# Patient Record
Sex: FEMALE | Race: WHITE | URBAN - METROPOLITAN AREA
[De-identification: names, ages, dates, MRNs, and addresses within clinical notes are randomized per-mention and may not be internally consistent; named-entity substitution may affect disease eponyms.]

---

## 2020-11-13 ENCOUNTER — HISTORICAL (OUTPATIENT)
Dept: ADMINISTRATIVE | Facility: HOSPITAL | Age: 80
End: 2020-11-13

## 2020-11-13 LAB
ABS NEUT (OLG): 3.46 X10(3)/MCL (ref 2.1–9.2)
BASOPHILS # BLD AUTO: 0 X10(3)/MCL (ref 0–0.2)
BASOPHILS NFR BLD AUTO: 0 %
BUN SERPL-MCNC: 21.6 MG/DL (ref 9.8–20.1)
CALCIUM SERPL-MCNC: 8 MG/DL (ref 8.4–10.2)
CHLORIDE SERPL-SCNC: 110 MMOL/L (ref 98–107)
CO2 SERPL-SCNC: 22 MMOL/L (ref 23–31)
CREAT SERPL-MCNC: 0.95 MG/DL (ref 0.55–1.02)
CREAT/UREA NIT SERPL: 23
EOSINOPHIL # BLD AUTO: 0.1 X10(3)/MCL (ref 0–0.9)
EOSINOPHIL NFR BLD AUTO: 2 %
ERYTHROCYTE [DISTWIDTH] IN BLOOD BY AUTOMATED COUNT: 13.3 % (ref 11.5–17)
GLUCOSE SERPL-MCNC: 106 MG/DL (ref 82–115)
HCT VFR BLD AUTO: 30.4 % (ref 37–47)
HGB BLD-MCNC: 9.9 GM/DL (ref 12–16)
LYMPHOCYTES # BLD AUTO: 1.8 X10(3)/MCL (ref 0.6–4.6)
LYMPHOCYTES NFR BLD AUTO: 29 %
MCH RBC QN AUTO: 29.7 PG (ref 27–31)
MCHC RBC AUTO-ENTMCNC: 32.6 GM/DL (ref 33–36)
MCV RBC AUTO: 91.3 FL (ref 80–94)
MONOCYTES # BLD AUTO: 0.6 X10(3)/MCL (ref 0.1–1.3)
MONOCYTES NFR BLD AUTO: 10 %
NEUTROPHILS # BLD AUTO: 3.46 X10(3)/MCL (ref 2.1–9.2)
NEUTROPHILS NFR BLD AUTO: 58 %
PLATELET # BLD AUTO: 244 X10(3)/MCL (ref 130–400)
PMV BLD AUTO: 11 FL (ref 9.4–12.4)
POTASSIUM SERPL-SCNC: 4.1 MMOL/L (ref 3.5–5.1)
RBC # BLD AUTO: 3.33 X10(6)/MCL (ref 4.2–5.4)
SODIUM SERPL-SCNC: 140 MMOL/L (ref 136–145)
WBC # SPEC AUTO: 6 X10(3)/MCL (ref 4.5–11.5)

## 2020-12-15 ENCOUNTER — HISTORICAL (OUTPATIENT)
Dept: ADMINISTRATIVE | Facility: HOSPITAL | Age: 80
End: 2020-12-15

## 2020-12-15 LAB
ABS NEUT (OLG): 6.1 X10(3)/MCL (ref 2.1–9.2)
BASOPHILS # BLD AUTO: 0 X10(3)/MCL (ref 0–0.2)
BASOPHILS NFR BLD AUTO: 0 %
EOSINOPHIL # BLD AUTO: 0.2 X10(3)/MCL (ref 0–0.9)
EOSINOPHIL NFR BLD AUTO: 2 %
ERYTHROCYTE [DISTWIDTH] IN BLOOD BY AUTOMATED COUNT: 14.5 % (ref 11.5–17)
HCT VFR BLD AUTO: 29.8 % (ref 37–47)
HGB BLD-MCNC: 9.2 GM/DL (ref 12–16)
LYMPHOCYTES # BLD AUTO: 1.9 X10(3)/MCL (ref 0.6–4.6)
LYMPHOCYTES NFR BLD AUTO: 21 %
MCH RBC QN AUTO: 28.8 PG (ref 27–31)
MCHC RBC AUTO-ENTMCNC: 30.9 GM/DL (ref 33–36)
MCV RBC AUTO: 93.1 FL (ref 80–94)
MONOCYTES # BLD AUTO: 0.7 X10(3)/MCL (ref 0.1–1.3)
MONOCYTES NFR BLD AUTO: 8 %
NEUTROPHILS # BLD AUTO: 6.1 X10(3)/MCL (ref 2.1–9.2)
NEUTROPHILS NFR BLD AUTO: 67 %
PLATELET # BLD AUTO: 385 X10(3)/MCL (ref 130–400)
PMV BLD AUTO: 10.5 FL (ref 9.4–12.4)
RBC # BLD AUTO: 3.2 X10(6)/MCL (ref 4.2–5.4)
WBC # SPEC AUTO: 9 X10(3)/MCL (ref 4.5–11.5)

## 2020-12-22 ENCOUNTER — HISTORICAL (OUTPATIENT)
Dept: ADMINISTRATIVE | Facility: HOSPITAL | Age: 80
End: 2020-12-22

## 2020-12-22 LAB
ABS NEUT (OLG): 12.16 X10(3)/MCL (ref 2.1–9.2)
BASOPHILS # BLD AUTO: 0 X10(3)/MCL (ref 0–0.2)
BASOPHILS NFR BLD AUTO: 0 %
BUN SERPL-MCNC: 31.4 MG/DL (ref 9.8–20.1)
CALCIUM SERPL-MCNC: 7.9 MG/DL (ref 8.4–10.2)
CHLORIDE SERPL-SCNC: 105 MMOL/L (ref 98–107)
CO2 SERPL-SCNC: 23 MMOL/L (ref 23–31)
CREAT SERPL-MCNC: 0.93 MG/DL (ref 0.55–1.02)
CREAT/UREA NIT SERPL: 34
EOSINOPHIL # BLD AUTO: 0.1 X10(3)/MCL (ref 0–0.9)
EOSINOPHIL NFR BLD AUTO: 1 %
ERYTHROCYTE [DISTWIDTH] IN BLOOD BY AUTOMATED COUNT: 15.3 % (ref 11.5–17)
GLUCOSE SERPL-MCNC: 111 MG/DL (ref 82–115)
HCT VFR BLD AUTO: 30.9 % (ref 37–47)
HGB BLD-MCNC: 9.3 GM/DL (ref 12–16)
LYMPHOCYTES # BLD AUTO: 1.4 X10(3)/MCL (ref 0.6–4.6)
LYMPHOCYTES NFR BLD AUTO: 10 %
MCH RBC QN AUTO: 27.5 PG (ref 27–31)
MCHC RBC AUTO-ENTMCNC: 30.1 GM/DL (ref 33–36)
MCV RBC AUTO: 91.4 FL (ref 80–94)
MONOCYTES # BLD AUTO: 1.1 X10(3)/MCL (ref 0.1–1.3)
MONOCYTES NFR BLD AUTO: 7 %
NEUTROPHILS # BLD AUTO: 12.16 X10(3)/MCL (ref 2.1–9.2)
NEUTROPHILS NFR BLD AUTO: 81 %
PLATELET # BLD AUTO: 528 X10(3)/MCL (ref 130–400)
PMV BLD AUTO: 10.2 FL (ref 9.4–12.4)
POTASSIUM SERPL-SCNC: 3.8 MMOL/L (ref 3.5–5.1)
RBC # BLD AUTO: 3.38 X10(6)/MCL (ref 4.2–5.4)
SODIUM SERPL-SCNC: 139 MMOL/L (ref 136–145)
WBC # SPEC AUTO: 15 X10(3)/MCL (ref 4.5–11.5)

## 2021-01-05 ENCOUNTER — HISTORICAL (OUTPATIENT)
Dept: ADMINISTRATIVE | Facility: HOSPITAL | Age: 81
End: 2021-01-05

## 2021-01-05 LAB
ABS NEUT (OLG): 5.51 X10(3)/MCL (ref 2.1–9.2)
ALBUMIN SERPL-MCNC: 1.6 GM/DL (ref 3.4–4.8)
ALBUMIN/GLOB SERPL: 0.5 RATIO (ref 1.1–2)
ALP SERPL-CCNC: 86 UNIT/L (ref 40–150)
ALT SERPL-CCNC: 8 UNIT/L (ref 0–55)
AST SERPL-CCNC: 25 UNIT/L (ref 5–34)
BASOPHILS # BLD AUTO: 0 X10(3)/MCL (ref 0–0.2)
BASOPHILS NFR BLD AUTO: 0 %
BILIRUB SERPL-MCNC: <0.5 MG/DL
BILIRUBIN DIRECT+TOT PNL SERPL-MCNC: 0.2 MG/DL (ref 0–0.5)
BILIRUBIN DIRECT+TOT PNL SERPL-MCNC: <0.3 MG/DL (ref 0–0.8)
BUN SERPL-MCNC: 21.6 MG/DL (ref 9.8–20.1)
CALCIUM SERPL-MCNC: 7.3 MG/DL (ref 8.4–10.2)
CHLORIDE SERPL-SCNC: 110 MMOL/L (ref 98–107)
CO2 SERPL-SCNC: 18 MMOL/L (ref 23–31)
CREAT SERPL-MCNC: 0.74 MG/DL (ref 0.55–1.02)
EOSINOPHIL # BLD AUTO: 0 X10(3)/MCL (ref 0–0.9)
EOSINOPHIL NFR BLD AUTO: 1 %
ERYTHROCYTE [DISTWIDTH] IN BLOOD BY AUTOMATED COUNT: 16.7 % (ref 11.5–17)
GLOBULIN SER-MCNC: 3.2 GM/DL (ref 2.4–3.5)
GLUCOSE SERPL-MCNC: 101 MG/DL (ref 82–115)
HCT VFR BLD AUTO: 26.1 % (ref 37–47)
HGB BLD-MCNC: 7.8 GM/DL (ref 12–16)
LYMPHOCYTES # BLD AUTO: 1 X10(3)/MCL (ref 0.6–4.6)
LYMPHOCYTES NFR BLD AUTO: 13 %
MCH RBC QN AUTO: 26.4 PG (ref 27–31)
MCHC RBC AUTO-ENTMCNC: 29.9 GM/DL (ref 33–36)
MCV RBC AUTO: 88.5 FL (ref 80–94)
MONOCYTES # BLD AUTO: 0.6 X10(3)/MCL (ref 0.1–1.3)
MONOCYTES NFR BLD AUTO: 8 %
NEUTROPHILS # BLD AUTO: 5.51 X10(3)/MCL (ref 2.1–9.2)
NEUTROPHILS NFR BLD AUTO: 77 %
PLATELET # BLD AUTO: 356 X10(3)/MCL (ref 130–400)
PMV BLD AUTO: 10.3 FL (ref 9.4–12.4)
POTASSIUM SERPL-SCNC: 4.8 MMOL/L (ref 3.5–5.1)
PROT SERPL-MCNC: 4.8 GM/DL (ref 5.8–7.6)
RBC # BLD AUTO: 2.95 X10(6)/MCL (ref 4.2–5.4)
SODIUM SERPL-SCNC: 140 MMOL/L (ref 136–145)
WBC # SPEC AUTO: 7.2 X10(3)/MCL (ref 4.5–11.5)

## 2021-02-19 ENCOUNTER — HISTORICAL (OUTPATIENT)
Dept: ADMINISTRATIVE | Facility: HOSPITAL | Age: 81
End: 2021-02-19

## 2021-02-19 LAB
APPEARANCE, UA: ABNORMAL
BACTERIA SPEC CULT: ABNORMAL /HPF
BILIRUB UR QL STRIP: NEGATIVE
COLOR UR: YELLOW
GLUCOSE (UA): NEGATIVE
HGB UR QL STRIP: ABNORMAL
KETONES UR QL STRIP: NEGATIVE
LEUKOCYTE ESTERASE UR QL STRIP: ABNORMAL
NITRITE UR QL STRIP: NEGATIVE
PH UR STRIP: 6.5 [PH] (ref 5–9)
PROT UR QL STRIP: ABNORMAL
RBC #/AREA URNS HPF: ABNORMAL /HPF
SP GR UR STRIP: 1.02 (ref 1–1.03)
SQUAMOUS EPITHELIAL, UA: ABNORMAL
UA WBC MAN: >200 /HPF
UROBILINOGEN UR STRIP-ACNC: 0.2

## 2021-02-21 LAB — FINAL CULTURE: NO GROWTH

## 2021-02-23 ENCOUNTER — HISTORICAL (OUTPATIENT)
Dept: ADMINISTRATIVE | Facility: HOSPITAL | Age: 81
End: 2021-02-23

## 2021-02-23 LAB
ERYTHROCYTE [DISTWIDTH] IN BLOOD BY AUTOMATED COUNT: 18.9 % (ref 11.5–17)
HCT VFR BLD AUTO: 28 % (ref 37–47)
HGB BLD-MCNC: 8.4 GM/DL (ref 12–16)
MCH RBC QN AUTO: 27.5 PG (ref 27–31)
MCHC RBC AUTO-ENTMCNC: 30 GM/DL (ref 33–36)
MCV RBC AUTO: 91.8 FL (ref 80–94)
PLATELET # BLD AUTO: 211 X10(3)/MCL (ref 130–400)
PMV BLD AUTO: 11.6 FL (ref 9.4–12.4)
RBC # BLD AUTO: 3.05 X10(6)/MCL (ref 4.2–5.4)
WBC # SPEC AUTO: 5.3 X10(3)/MCL (ref 4.5–11.5)

## 2021-05-25 ENCOUNTER — HOSPITAL ENCOUNTER (OUTPATIENT)
Dept: MEDSURG UNIT | Facility: HOSPITAL | Age: 81
End: 2021-05-28
Attending: INTERNAL MEDICINE | Admitting: INTERNAL MEDICINE

## 2021-05-25 LAB
CK MB SERPL-MCNC: 0.7 NG/ML
CK MB SERPL-MCNC: 1.1 NG/ML
CK SERPL-CCNC: 32 U/L (ref 29–168)
CK SERPL-CCNC: 43 U/L (ref 29–168)
SARS-COV-2 AG RESP QL IA.RAPID: NOT DETECTED
TROPONIN I SERPL-MCNC: 0.08 NG/ML (ref 0–0.04)
TROPONIN I SERPL-MCNC: 0.09 NG/ML (ref 0–0.04)

## 2021-05-26 LAB
ABS NEUT (OLG): 4.3 X10(3)/MCL (ref 1.5–6.9)
ALBUMIN SERPL-MCNC: 2.7 GM/DL (ref 3.4–4.8)
ALBUMIN/GLOB SERPL: 1 RATIO (ref 1.1–2)
ALP SERPL-CCNC: 91 UNIT/L (ref 40–150)
ALT SERPL-CCNC: 11 UNIT/L (ref 0–55)
AST SERPL-CCNC: 14 UNIT/L (ref 5–34)
BILIRUB SERPL-MCNC: 0.2 MG/DL
BILIRUBIN DIRECT+TOT PNL SERPL-MCNC: 0.1 MG/DL (ref 0–0.5)
BILIRUBIN DIRECT+TOT PNL SERPL-MCNC: 0.1 MG/DL (ref 0–0.8)
BNP BLD-MCNC: 1567.2 PG/ML
BUN SERPL-MCNC: 26 MG/DL (ref 9.8–20.1)
CALCIUM SERPL-MCNC: 8.3 MG/DL (ref 8.4–10.2)
CHLORIDE SERPL-SCNC: 105 MMOL/L (ref 98–107)
CK MB SERPL-MCNC: 0.6 NG/ML
CK SERPL-CCNC: 26 U/L (ref 29–168)
CO2 SERPL-SCNC: 26 MMOL/L (ref 23–31)
CREAT SERPL-MCNC: 1.26 MG/DL (ref 0.55–1.02)
ERYTHROCYTE [DISTWIDTH] IN BLOOD BY AUTOMATED COUNT: 16.4 % (ref 11.5–17)
GLOBULIN SER-MCNC: 2.8 GM/DL (ref 2.4–3.5)
GLUCOSE SERPL-MCNC: 128 MG/DL (ref 82–115)
HCT VFR BLD AUTO: 26.1 % (ref 36–48)
HGB BLD-MCNC: 8.4 GM/DL (ref 12–16)
IMM GRANULOCYTES # BLD AUTO: 0.03 10*3/UL (ref 0–0.02)
IMM GRANULOCYTES NFR BLD AUTO: 0.5 % (ref 0–0.43)
LYMPHOCYTES # BLD AUTO: 0.9 X10(3)/MCL (ref 0.5–4.1)
LYMPHOCYTES NFR BLD AUTO: 16 % (ref 15–40)
MAGNESIUM SERPL-MCNC: 1.9 MG/DL (ref 1.6–2.6)
MCH RBC QN AUTO: 30 PG (ref 27–34)
MCHC RBC AUTO-ENTMCNC: 32 GM/DL (ref 31–36)
MCV RBC AUTO: 93 FL (ref 80–99)
MONOCYTES # BLD AUTO: 0.6 X10(3)/MCL (ref 0–1.1)
MONOCYTES NFR BLD AUTO: 10 % (ref 4–12)
NEUTROPHILS # BLD AUTO: 4.3 X10(3)/MCL (ref 1.5–6.9)
NEUTROPHILS NFR BLD AUTO: 74 % (ref 43–75)
PLATELET # BLD AUTO: 180 X10(3)/MCL (ref 140–400)
PMV BLD AUTO: 10.7 FL (ref 6.8–10)
POTASSIUM SERPL-SCNC: 4 MMOL/L (ref 3.5–5.1)
PROT SERPL-MCNC: 5.5 GM/DL (ref 5.8–7.6)
RBC # BLD AUTO: 2.81 X10(6)/MCL (ref 4.2–5.4)
SODIUM SERPL-SCNC: 139 MMOL/L (ref 136–145)
TROPONIN I SERPL-MCNC: 0.07 NG/ML (ref 0–0.04)
WBC # SPEC AUTO: 5.9 X10(3)/MCL (ref 4.5–11.5)

## 2021-07-31 ENCOUNTER — HISTORICAL (OUTPATIENT)
Dept: ADMINISTRATIVE | Facility: HOSPITAL | Age: 81
End: 2021-07-31

## 2022-05-01 NOTE — CONSULTS
Patient:   Humaira Conley             MRN: [id number]            FIN: [id number]               Age:   80 years     Sex:  Female     :  1940   Associated Diagnoses:   None   Author:   Daryl Oro MD      Basic Information   Source of history:  Self, Medical record.       Chief Complaint   SOB      History of Present Illness   Patient is a 81 yo female unknown to [facility]. He presented in transfer from another hospital due to lack of beds. She presented with acute SOB with a BNP greater than 9000 at [place]/ CXR did show some cardiomegaly with pulmonary vascular congestion. Echocardiogram showed an EF of 25%. She was also diagnosed with a UTI as well. She does reside at a NH in [place]. She is already feeling better but was admitted for further treatment. She has diuresed ~1 liter since admission. She denies any CP or SOB.        Review of Systems   Constitutional:  Negative.    Respiratory:  Negative.    Cardiovascular:  Negative.    Gastrointestinal:  Negative.       Health Status   Allergies:    Allergic Reactions (Selected)  Severity Not Documented  Sulfa drugs- Rash.,    Allergies (1) Active Reaction  sulfa drugs Rash     Current medications:  (Selected)   Inpatient Medications  Ordered  Ambien: 10 mg, form: Tab, Oral, Once a day (at bedtime) PRN for insomnia, first dose 21 12:06:00 CDT, STAT, if patient is less than 70 years old  Lovenox: 40 mg, form: Injection, Subcutaneous, Daily, first dose 21 9:00:00 CDT, for all risk catagories  Maalox Antacid [place] Regular Strength: 30 mL, form: Susp, Oral, q4hr PRN for dyspepsia, first dose 21 12:06:00 CDT  Milk of Magnesia: 30 mL, form: Susp, Oral, Daily PRN for constipation, first dose 21 12:06:00 CDT  Plavix 75 mg oral tablet: 75 mg, form: Tab, Oral, qAM, first dose 21 6:00:00 CDT  Remeron: 30 mg, form: Tab, Oral, qPM, first dose 21 21:00:00 CDT  Senokot S: 1 tab(s), form: Tab, Oral, BID PRN for constipation, first dose  05/25/21 12:06:00 CDT, choose only if unrelieved by Milk of Magnesia or choose first if ordered alone.  Tylenol: 1,000 mg, form: Tab, Oral, q6hr PRN for pain, mild, first dose 05/25/21 12:06:00 CDT, or fever; No more than 4 grams in 24 hours  Xanax: 0.25 mg, form: Tab, Oral, q6hr PRN for anxiety, first dose 05/25/21 12:06:00 CDT  Zofran: 4 mg, form: Injection, IV Push, q8hr PRN for nausea/vomiting, first dose 05/25/21 12:06:00 CDT  acetaminophen-hydrocodone 325 mg-5 mg oral tablet: 1 tab(s), form: Tab, Oral, q6hr PRN for pain, first dose 05/25/21 13:05:00 CDT  ascorbic acid 250 mg oral tablet: 500 mg, form: Tab, Oral, BID, first dose 05/25/21 21:00:00 CDT  aspirin 81 mg oral Delayed Release (EC) tablet: 81 mg, form: Tab-EC, Oral, qAM, first dose 05/26/21 6:00:00 CDT  atorvastatin 10 mg oral tablet: 10 mg, form: Tab, Oral, Daily, first dose 05/25/21 17:00:00 CDT  buPROPion 100 mg oral tablet: 100 mg, form: Tab, Oral, qAM, first dose 05/26/21 6:00:00 CDT  calcitriol: 0.5 mcg, form: Cap, Oral, Daily, first dose 05/26/21 9:00:00 CDT  calcium (as carbonate)-vitamin D 600 mg-400 intl units (10 mcg) oral tablet: 1 tab(s), form: Tab, Oral, BID, first dose 05/25/21 12:39:00 CDT  megestrol 40 mg oral tablet: 40 mg, form: Tab, Oral, BID, first dose 05/25/21 21:00:00 CDT  metoprolol tartrate 25 mg oral tab: 25 mg, form: Tab, Oral, Daily, first dose 05/26/21 9:00:00 CDT  senna 8.6 mg oral tablet: 2 tab(s), 17.2 mg =, form: Tab, Oral, BID, first dose 05/25/21 12:40:00 CDT  zinc sulfate 220 mg oral capsule: 220 mg, form: Cap, Oral, Daily, first dose 05/26/21 9:00:00 CDT  Documented Medications  Documented  Multiple Vitamins oral tablet: 1 tab(s), Oral, qAM  Pepcid AC Maximum Strength 20 mg oral tablet: 20 mg = 1 tab(s), Oral, Daily  Plavix 75 mg oral tablet: 75 mg = 1 tab(s), Oral, qAM  Singulair 10 mg oral TABLET: 10 mg = 1 tab(s), Oral, qAM  Vistaril 25 mg oral capsule: 25 mg = 1 cap(s), Oral, q6hr  acetaminophen-hydrocodone 325  mg-5 mg oral tablet: 1 tab(s), Oral, q6hr  amoxicillin-clavulanate 500 mg-125 mg oral tablet: = 1 tab(s), Oral, BID  ascorbic acid 500 mg oral tablet: 500 mg = 1 tab(s), Oral, BID  aspirin 81 mg oral Delayed Release (EC) tablet: 81 mg = 1 tab(s), Oral, qAM  atorvastatin 10 mg oral tablet: 10 mg = 1 tab(s), Oral, Daily  buPROPion 100 mg oral tablet: 100 mg = 1 tab(s), Oral, qAM  calcitriol 0.5 mcg oral capsule: 0.5 mcg = 1 cap(s), Oral, Daily  calcium (as carbonate)-vitamin D 600 mg-400 intl units (10 mcg) oral tablet: 1 tab(s), Oral, BID  calcium carbonate 1250 mg (500 mg elemental calcium) oral tablet: 1250 mg = 1 tab(s), Oral, BID  cyanocobalamin 1000 mcg/mL injectable solution: 1,000 mcg = 1 mL, IM, qMonth, # 10 mL, 0 Refill(s)  ferrous sulfate 324 mg (65 mg elemental iron) oral delayed release tablet: 324 mg = 1 tab(s), Oral, Daily  megestrol 40 mg oral tablet: 40 mg = 1 tab(s), Oral, BID  metoprolol tartrate 25 mg oral tab: 25 mg = 1 tab(s), Oral, Daily  mirtazapine 30 mg oral tablet: 30 mg = 1 tab(s), Oral, qPM  senna 8.6 mg oral tablet: 17.2 mg = 2 tab(s), Oral, BID  zinc sulfate 220 mg oral tablet: 220 mg = 1 tab(s), Oral, Daily   Problem list:    All Problems  Coronary artery disease / SNOMED CT 8851213799 / Confirmed  HTN - Hypertension / SNOMED CT 6279688735 / Confirmed      Histories   Past Medical History:    Active  HTN - Hypertension ([id number])  Coronary artery disease ([id number])   Family History:    No family history items have been selected or recorded.   Procedure history:    No active procedure history items have been selected or recorded.   Social History        Social & Psychosocial Habits    Tobacco  05/25/2021  Use: Never (less than 100 in l    Patient Wants Consult For Cessation Counseling N/A    Abuse/Neglect  05/25/2021  SHX Any signs of abuse or neglect No  .        Physical Examination   General:  No acute distress.    Eye:  Extraocular movements are intact.    Respiratory:  Lungs are  clear to auscultation.    Cardiovascular:  Normal rate, Regular rhythm, No murmur, No edema.       Vital Signs (last 24 hrs)_____  Last Charted___________  Temp Oral     36.5 DegC  (MAY 26 07:39)  Heart Rate Peripheral   89 bpm  (MAY 26 07:39)  Resp Rate         13 br/min  (MAY 25 12:00)  SBP      107 mmHg  (MAY 26 07:39)  DBP      71 mmHg  (MAY 26 07:39)  SpO2      98 %  (MAY 26 07:56)  Weight      48.3 kg  (MAY 26 04:00)  Height      157 cm  (MAY 25 12:08)  BMI      24.26  (MAY 25 12:08)     Musculoskeletal:  Normal range of motion, Left AKA.    Integumentary:  Warm, Dry.    Neurologic:  Alert.    Psychiatric:  Cooperative.       Review / Management   Results review:     Labs (Last four charted values)  WBC                  5.9 (MAY 26)   Hgb                  L 8.4 (MAY 26)   Hct                  L 26.1 (MAY 26)   Plt                  180 (MAY 26)   Na                   139 (MAY 26)   K                    4.0 (MAY 26)   CO2                  26 (MAY 26)   Cl                   105 (MAY 26)   Cr                   H 1.26 (MAY 26)   BUN                  H 26.0 (MAY 26)   Glucose Random       H 128 (MAY 26) .       Impression and Plan     Acute Systolic HF (EF 25%)    Continue Toprol    Unable to start ACEi/ARB due to BP, consider ARB later    Will consider ischemic evaluation as outpatient    Will get her on some low dose PO lasix as she will likely need this upon discharge    Cont ASA/Statin    Discharge once back to baseline and f/u in [facility]    HTN, stable    Continue BB    UTI    Abx per primary    L AKA    She states it was due to infection    Dementia

## 2022-05-01 NOTE — ED PROVIDER NOTES
Patient:   Humaira Conley             MRN: [id number]            FIN: [id number]               Age:   80 years     Sex:  Female     :  1940   Associated Diagnoses:   Congestive heart failure; Urinary tract infection   Author:   Moshe Freeman MD      Basic Information   Time seen: Date & time 2021 09:40:00.   History source: EMS, Physician from [facility].   Arrival mode: Ambulance.   History limitation: Cognitive impairment.   Additional information: Chief Complaint from Nursing Triage Note : Chief Complaint   2021 9:40 CDT       Chief Complaint           brought per [facility] as a transfer from [facility] for admission to a medicine bed for CHF, respiratory acidosis, and UTI  .   Provider/Visit info:   Time Seen:  Moshe Freeman MD / 2021 09:40  .   History of Present Illness   The patient presents with difficulty breathing and cough.  The onset was During the night.  The course/duration of symptoms is resolved: for couple of hours.  Degree at onset severe.  Degree at present none.  The Exacerbating factors is none.  There are Relieving factorss including oxygen and Lasix.  Risk factors consist of hypertension and Dementia.  Prior episodes: none.  Associated symptoms: none.    Patient who has dementia and is a resident of a nursing home was found to have acute shortness of breath and cough during the night, ambulance was called to the treatments and she was put on 100% nonrebreather because she was hypoxic and she was brought to the emergency room and [facility] where the workup reveals that patient has congestive heart failure, she was also found to have urinary tract infection, they did not have any beds the transfer the patient to us for admission and further management.  By the time she came to us she is awake alert sitting in the bed denies any complaints except for some back pain and feeling hungry and she wants to drink some coffee and eat some food.  Denies any other  complaints..        Review of Systems   Constitutional symptoms:  No fever,    Skin symptoms:  No rash,    Respiratory symptoms:  Shortness of breath, cough, no stridor, no wheezing.    Cardiovascular symptoms:  No peripheral edema,    Gastrointestinal symptoms:  No vomiting, no diarrhea, no constipation, no rectal bleeding.    Genitourinary symptoms:  No hematuria,    Musculoskeletal symptoms:  Back pain.   Neurologic symptoms:  Altered level of consciousness, weakness.    Psychiatric symptoms:  No substance abuse,              Additional review of systems information: Unable to obtain due to: Dementia.      Health Status   Allergies:    Allergic Reactions (Selected)  Severity Not Documented  Sulfa drugs- Rash..   Medications: Per nurse's notes.      Past Medical/ Family/ Social History   Medical history:    Active  HTN - Hypertension (SNOMED CT [id number]), Reviewed as documented in chart.   Surgical history: Unknown.   Family history: Unknown.   Social history:    Social & Psychosocial Habits    Tobacco  05/25/2021  Use: Never (less than 100 in l    Patient Wants Consult For Cessation Counseling N/A    Abuse/Neglect  05/25/2021  SHX Any signs of abuse or neglect No  , Family/social situation: Nursing home resident.   Problem list:    No qualifying data available  , per nurse's notes.      Physical Examination               Vital Signs   Vital Signs   5/25/2021 9:40 CDT       Temperature Oral          36.8 DegC                             Temperature Oral (calculated)             98.24 DegF                             Peripheral Pulse Rate     95 bpm                             Respiratory Rate          20 br/min                             SpO2                      99 %                             Oxygen Therapy            Nasal cannula                             Oxygen Flow Rate          2 L/min                             Systolic Blood Pressure   105 mmHg                             Diastolic Blood Pressure   59 mmHg  LOW  .   Measurements   2021 9:40 CDT       Weight Dosing             59.8 kg                             Weight Measured and Calculated in Lbs     131.84 lb                             Weight Estimated          59.8 kg                             Height/Length Dosing      157 cm                             Height/Length Estimated   157 cm                             Body Mass Index Estimated 24.26 kg/m2  .   Basic Oxygen Information   2021 9:40 CDT       SpO2                      99 %                             Oxygen Therapy            Nasal cannula                             Oxygen Flow Rate          2 L/min  .   General:  Alert, no acute distress.    Skin:  Intact.   Head:  Atraumatic.   Neck:  Supple.   Eye:  Extraocular movements are intact.   Ears, nose, mouth and throat:  Oral mucosa moist.   Cardiovascular:  Regular rate and rhythm, No murmur, Normal peripheral perfusion, No edema.    Respiratory:  Lungs are clear to auscultation, respirations are non-labored, breath sounds are equal, Symmetrical chest wall expansion.    Gastrointestinal:  Soft, Non distended, Normal bowel sounds.    Neurological:  No focal neurological deficit observed.   Psychiatric:  Cooperative.      Medical Decision Making   Documents reviewed:  Emergency department nurses' notes.   Orders  Launch Order Profile (Selected)   Inpatient Orders  Ordered  Admit as Inpatient: 21 10:07:00 CDT, Telemetry with Monitor Yonatan EPPS, Colby PAGAN No, CHF, UTI, Dementia  Capacity Management Bed Request: 21 10:07:52 CDT, Telemetry with Monitor  Perineal Care: 21 10:04:18 CDT, BID  Urinary Catheter Monitorin21 10:04:18 CDT, BID  Urinary Catheter Nurse Driven Protocol: BID, 21 10:04:18 CDT  Completed  Urinary Catheter Insertion: 21 10:04:00 CDT, Indwelling, Yes.   Results review:     No qualifying data available.      Impression and Plan   Diagnosis   Congestive heart failure (VTE17-QH I50.9)    Urinary tract infection (JFO81-SN N39.0)      Calls-Consults   -  5/25/2021 10:01:00 , Colby Tam MD, recommends Okay to admit.    Plan   Disposition: Admit time  5/25/2021 10:08:00, Admit to Inpatient Telemetry Unit.    Orders: Launch Orders   Admit/Transfer/Discharge:  Admit as Inpatient (Order): 5/25/2021 10:07 CDT, Telemetry with Monitor Yonatan EPPS, Colby PAGAN, No, CHF, UTI, Dementia,.

## 2022-05-05 NOTE — HISTORICAL [facility]
This is a historical note converted from [employer]. Formatting and pictures may have been removed.  Please reference Reynaldo for original formatting and attached multimedia. Chief Complaint  brought per [facility] as a transfer from [place] ER for admission to a medicine bed for CHF, respiratory acidosis, and UTI  History of Present Illness  79yo female presents in transfer from another hospital due to lack of beds.? She presented with acute SOB which appears to be an acute exacerbation of her CHF.? She does not know her EF.? An ECHO has been ordered.? Her BNP was greater than 9000 at [place]/? CXR did show some cardiomegaly with pulmonary vascular congestion.? She was also diagnosed with a UTI as well.? Currently no N/V/D/C.? NO fever/chills.? No chest pain but she was SOB especially with exertion.? She does reside at [facility] in [place].? She is already feeling better but will be admitted for further treatment.  Review of Systems  ?  ?????Constitutional: ?No fever, No chills, No sweats,?+ weakness, No fatigue. ?  ?????Eye: ?No double vision, No dry eyes, No photophobia. ?  ?????Ear/Nose/Mouth/Throat: ?No ear pain, No nasal congestion, No sore throat. ?  ?????Respiratory:??+ shortness of breath, No cough, No sputum production, No hemoptysis, No wheezing, No pleuritic pain. ?  ?????Cardiovascular: ?No chest pain, No palpitations,?+ peripheral edema, No syncope. ?  ?????Gastrointestinal: ?No nausea, No vomiting, No diarrhea, No constipation, No heartburn, No abdominal pain. ?  ?????Genitourinary: ?No dysuria, No hematuria, No change in urine stream. ?  ?????Hematology/Lymphatics: ?No anemia, No bruising tendency, No bruise, No hemorrhage, No petechiae. ?  ?????Endocrine: ?No excessive thirst, No polyuria, No cold intolerance. ?  ?????Immunologic: ?No recurrent fevers, No recurrent infections. ?  ?????Musculoskeletal: ?Joint pain, No back pain, No muscle pain, No decreased range of motion. ?  ?????Integumentary: ?No rash,  No pruritus, No petechiae, No skin lesion. ?  ?????Neurologic: ?Alert and oriented X4, No abnormal balance, No confusion, No tingling. ?  ?????Psychiatric: ?No anxiety, No depression. ?  Physical Exam  Vitals & Measurements  T:?35.7? ?C (Oral)? TMIN:?35.7? ?C (Oral)? TMAX:?36.8? ?C (Oral)? HR:?80(Peripheral)? RR:?13? BP:?96/55? SpO2:?96%? WT:?59.8?kg? BMI:?24.26?  General: ?Alert and oriented, No acute distress. ?  ??????? ? Appearance: frail. ?  ??????? ? Behavior: Appropriate. ?  ??????? ? Skin: Normal for ethnicity. ?  ?????Eye: ?Pupils are equal, round and reactive to light, Extraocular movements are intact. ?  ?????HENT: ?Normocephalic, Normal hearing, Oral mucosa is moist, No pharyngeal erythema. ?  ?????Neck: ?Supple, Non-tender, No carotid bruit, No jugular venous distention, No lymphadenopathy, No thyromegaly. ?  ?????Respiratory:??few rales at bases, Breath sounds are equal, No chest wall tenderness. ?  ?????Cardiovascular: ?Normal rate, Regular rhythm, No murmur, No gallop, Good pulses equal in all extremities, Normal peripheral perfusion,?trace edema to right leg. ?  ?????Gastrointestinal: ?Soft, Non-tender, Non-distended, Normal bowel sounds, No organomegaly. ?  ?????Genitourinary: ?No costovertebral angle tenderness, No urethral discharge. ?  ?????Lymphatics: ?No lymphadenopathy neck, axilla, groin. ?  ?????Musculoskeletal: ?Normal range of motion, Normal strength, No tenderness, No swelling, Normal gait. ?  ?????Integumentary: ?Warm, Dry, Pink, Intact, No rash. ?  ?????Neurologic: ?Alert, Oriented, Normal sensory, Normal motor function, No focal deficits, Cranial Nerves II-XII are grossly intact. ?  ?????Psychiatric: ?Cooperative, Appropriate mood & affect, Normal judgment. ?  Assessment/Plan  1.?Acute systolic heart failure?I50.21  2.?Urinary tract infection?N39.0  3.?Coronary artery disease?I25.10  4.?HTN - Hypertension?I10  5.?Dementia?F03.90  Orders:  acetaminophen-HYDROcodone, 1 tab(s), form:  Tab, Oral, q6hr PRN for pain, first dose 05/25/21 13:05:00 CDT  ascorbic acid, 500 mg, form: Tab, Oral, BID, first dose 05/25/21 21:00:00 CDT  aspirin, 81 mg, form: Tab-EC, Oral, qAM, first dose 05/26/21 6:00:00 CDT  atorvastatin, 10 mg, form: Tab, Oral, Daily, first dose 05/25/21 17:00:00 CDT  buPROPion, 100 mg, form: Tab, Oral, qAM, first dose 05/26/21 6:00:00 CDT  calcitriol, 0.5 mcg, form: Cap, Oral, Daily, first dose 05/26/21 9:00:00 CDT  calcium-vitamin D, 1 tab(s), form: Tab, Oral, BID, first dose 05/25/21 12:39:00 CDT  cefTRIAXone, 1 gm, form: Injection Complicated UTI, IV, Once, Infuse over: 0.5 hr, first dose 05/25/21 16:46:00 CDT, stop date 05/25/21 16:46:00 CDT  clopidogrel, 75 mg, form: Tab, Oral, qAM, first dose 05/26/21 6:00:00 CDT  megestrol, 40 mg, form: Tab, Oral, BID, first dose 05/25/21 21:00:00 CDT  metoprolol, 25 mg, form: Tab, Oral, Daily, first dose 05/26/21 9:00:00 CDT  mirtazapine, 30 mg, form: Tab, Oral, qPM, first dose 05/25/21 21:00:00 CDT  senna, 2 tab(s), 17.2 mg =, form: Tab, Oral, BID, first dose 05/25/21 12:40:00 CDT  zinc sulfate, 220 mg, form: Cap, Oral, Daily, first dose 05/26/21 9:00:00 CDT  B-Type Natriuretic Peptide, AM Next collect, 05/26/21 5:00:00 CDT, Blood, Stop date 05/26/21 5:00:00 CDT, Lab Collect, 05/26/21 5:00:00 CDT  CBC w/ Auto Diff, AM Next collect, 05/26/21 5:00:00 CDT, Blood, Stop date 05/26/21 5:00:00 CDT, Lab Collect, 05/26/21 5:00:00 CDT  Comprehensive Metabolic Panel, Routine collect, 05/26/21 5:00:00 CDT, Blood, Stop date 05/26/21 5:00:00 CDT, Lab Collect, in AM, 05/26/21 5:00:00 CDT  Consult to Physician, 05/25/21 12:32:00 CDT, Preethi EPPS, Daryl SNATOS, CHF, No  Magnesium Level, AM Next collect, 05/26/21 5:00:00 CDT, Blood, Stop date 05/26/21 5:00:00 CDT, Lab Collect, 05/26/21 5:00:00 CDT  Resuscitation Status, 05/25/21 12:34:00 CDT, Do Not Resuscitate  Urine Culture [id number], Now collect, 05/25/21 15:47:00 CDT, Urine, Nurse collect, Stop date 05/25/21 15:47:00  CDT  Admit to inpatient on 5/25/21 at 10:07  follow labs  review home meds  ECHO  IV Rocephin for UTI  repeat urine culture  DVT prophylaxis  will hold IV lasix for now since her BP is a little low but she will require some diuresis  consult PT  telemetry  will likely need an ACE/ARB but will wait for ECHO   Problem List/Past Medical History  Ongoing  Coronary artery disease  HTN - Hypertension  Historical  No qualifying data  Procedure/Surgical History  left AKA  Medications  Inpatient  acetaminophen-hydrocodone 325 mg-5 mg oral tablet, 1 tab(s), Oral, q6hr, PRN  Ambien, 10 mg= 2 tab(s), Oral, Once a day (at bedtime), PRN  ascorbic acid 250 mg oral tablet, 500 mg= 2 tab(s), Oral, BID  aspirin 81 mg oral Delayed Release (EC) tablet, 81 mg= 1 tab(s), Oral, qAM  atorvastatin 10 mg oral tablet, 10 mg= 1 tab(s), Oral, Daily  buPROPion 100 mg oral tablet, 100 mg= 1 tab(s), Oral, qAM  calcitriol, 0.5 mcg= 2 cap(s), Oral, Daily  calcium (as carbonate)-vitamin D 600 mg-400 intl units (10 mcg) oral tablet, 1 tab(s), Oral, BID  Lovenox, 40 mg= 0.4 mL, Subcutaneous, Daily  Maalox Antacid [place] Regular Strength, 30 mL, Oral, q4hr, PRN  megestrol 40 mg oral tablet, 40 mg= 1 tab(s), Oral, BID  metoprolol tartrate 25 mg oral tab, 25 mg= 1 tab(s), Oral, Daily  Milk of Magnesia, 30 mL, Oral, Daily, PRN  Plavix 75 mg oral tablet, 75 mg= 1 tab(s), Oral, qAM  Remeron, 30 mg= 2 tab(s), Oral, qPM  Rocephin 1 g injection  senna 8.6 mg oral tablet, 17.2 mg= 2 tab(s), Oral, BID  Senokot S, 1 tab(s), Oral, BID, PRN  Tylenol, 1000 mg= 2 tab(s), Oral, q6hr, PRN  Xanax, 0.25 mg= 1 tab(s), Oral, q6hr, PRN  zinc sulfate 220 mg oral capsule, 220 mg= 1 cap(s), Oral, Daily  Zofran, 4 mg= 2 mL, IV Push, q8hr, PRN  Home  acetaminophen-hydrocodone 325 mg-5 mg oral tablet, 1 tab(s), Oral, q6hr  amoxicillin-clavulanate 500 mg-125 mg oral tablet, 1 tab(s), Oral, BID  ascorbic acid 500 mg oral tablet, 500 mg= 1 tab(s), Oral, BID  aspirin 81 mg oral  Delayed Release (EC) tablet, 81 mg= 1 tab(s), Oral, qAM  atorvastatin 10 mg oral tablet, 10 mg= 1 tab(s), Oral, Daily  buPROPion 100 mg oral tablet, 100 mg= 1 tab(s), Oral, qAM  calcitriol 0.5 mcg oral capsule, 0.5 mcg= 1 cap(s), Oral, Daily  calcium (as carbonate)-vitamin D 600 mg-400 intl units (10 mcg) oral tablet, 1 tab(s), Oral, BID  calcium carbonate 1250 mg (500 mg elemental calcium) oral tablet, 1250 mg= 1 tab(s), Oral, BID  cyanocobalamin 1000 mcg/mL injectable solution, 1000 mcg= 1 mL, IM, qMonth  ferrous sulfate 324 mg (65 mg elemental iron) oral delayed release tablet, 324 mg= 1 tab(s), Oral, Daily  megestrol 40 mg oral tablet, 40 mg= 1 tab(s), Oral, BID  metoprolol tartrate 25 mg oral tab, 25 mg= 1 tab(s), Oral, Daily  mirtazapine 30 mg oral tablet, 30 mg= 1 tab(s), Oral, qPM  Multiple Vitamins oral tablet, 1 tab(s), Oral, qAM  Pepcid AC Maximum Strength 20 mg oral tablet, 20 mg= 1 tab(s), Oral, Daily  Plavix 75 mg oral tablet, 75 mg= 1 tab(s), Oral, qAM  senna 8.6 mg oral tablet, 17.2 mg= 2 tab(s), Oral, BID  Singulair 10 mg oral TABLET, 10 mg= 1 tab(s), Oral, qAM  Vistaril 25 mg oral capsule, 25 mg= 1 cap(s), Oral, q6hr  zinc sulfate 220 mg oral tablet, 220 mg= 1 tab(s), Oral, Daily  Allergies  sulfa drugs?(Rash)  Social History  Abuse/Neglect  No, 05/25/2021  Tobacco  Never (less than 100 in lifetime), N/A, 05/25/2021  Family History  HTN, CAD  Lab Results  Test Name Test Result Date/Time Comments   Total CK 43 U/L 05/25/2021 13:24 CDT    CK MB 1.1 ng/mL 05/25/2021 13:24 CDT    Troponin-I 0.092 ng/mL (High) 05/25/2021 13:24 CDT 0.5 ng/mL is the accepted discriminant level for mycardial injury rather than a statistical normal limit for the general population.

## 2022-05-05 NOTE — HISTORICAL [facility] CERNER
This is a historical note converted from [employer]. Formatting and pictures may have been removed.  Please reference [employer] for original formatting and attached multimedia. Admit and Discharge Dates  Admit Date: 05/25/2021  Discharge Date: 05/28/2021  Physicians  Attending Physician - Yonatan EPPS, Colby PAGAN  Admitting Physician - Yonatan EPPS, Colby PAGAN  Consulting Physician - Preethi EPPS, Daryl SANTOS  Primary Care Physician - Physician MD, Non Staff  Discharge Diagnosis  1.?Acute on chronic HFrEF (heart failure with reduced ejection fraction)?I50.23  2.?Urinary tract infection?N39.0  3.?Coronary artery disease?I25.10  4.?HTN - Hypertension?I10  5.?Dementia?F03.90  6.?Chronic anemia?D64.9  Surgical Procedures  No procedures recorded for this visit.  Immunizations  No immunizations recorded for this visit.  Hospital Course  79yo [facility] resident transferred from [facility] and admitted for Acute CHF Exacerbation and UTI. Placed on IV diuresis with good results.?ECHO showed EF 25-30%, Grade 1 Diastolic Dysfunction, trace AR and trace TR.?Seen by Cardiology. Contd on BB but unable to initiate ACEi/ARB secondary to low BP. On ASA/Statin. UrCx from [facility] grew pansensitive E.coli and pt will be dcd with Levaquin. Pt improved clinically and discharged back to NH ([facility]) in stable condition.  Time Spent on discharge  35 minutes  Objective  Vitals & Measurements  T:?36.8? ?C (Oral)? TMIN:?36? ?C (Oral)? TMAX:?36.8? ?C (Oral)? HR:?79(Peripheral)? HR:?72(Monitored)? RR:?20? BP:?116/76? SpO2:?98%?  Physical Exam  GEN: WNWD, comfortable appearing  HEENT: NCAT, MMM, EOMI, neck supple, no thyromegaly, anicteric sclerae, conjunctivae pink  CV: RRR, no MRG  CHEST: CTAB, no WRR  ABD: BS+, soft, NTND  EXTR: MAEW, no CCE, Left AKA  SKIN: warm, dry, no lesions  NEURO: CN II-XII intact grossly, no focal deficits  PSYCH: calm, cooperative, pleasant  Patient Discharge Condition  stable, improved  Discharge Disposition  [facility]    Discharge Medication Reconciliation  Prescribed  furosemide (Lasix 20 mg oral tablet)?20 mg, Oral, Daily  levoFLOXacin (Levaquin 500 mg oral tablet)?500 mg, Oral, q24hr  Continue  acetaminophen-HYDROcodone (acetaminophen-hydrocodone 325 mg-5 mg oral tablet)?1 tab(s), Oral, q6hr  ascorbic acid (ascorbic acid 500 mg oral tablet)?500 mg, Oral, BID  aspirin (aspirin 81 mg oral Delayed Release (EC) tablet)?81 mg, Oral, qAM  atorvastatin (atorvastatin 10 mg oral tablet)?10 mg, Oral, Daily  buPROPion (buPROPion 100 mg oral tablet)?100 mg, Oral, qAM  calcitriol (calcitriol 0.5 mcg oral capsule)?0.5 mcg, Oral, Daily  calcium carbonate (calcium carbonate 1250 mg (500 mg elemental calcium) oral tablet)?1250 mg, Oral, BID  calcium-vitamin D (calcium (as carbonate)-vitamin D 600 mg-400 intl units (10 mcg) oral tablet)?1 tab(s), Oral, BID  clopidogrel (Plavix 75 mg oral tablet)?75 mg, Oral, qAM  cyanocobalamin (cyanocobalamin 1000 mcg/mL injectable solution)?1,000 mcg, IM, qMonth  famotidine (Pepcid AC Maximum Strength 20 mg oral tablet)?20 mg, Oral, Daily  ferrous sulfate (ferrous sulfate 324 mg (65 mg elemental iron) oral delayed release tablet)?324 mg, Oral, Daily  hydrOXYzine (Vistaril 25 mg oral capsule)?25 mg, Oral, q6hr  megestrol (megestrol 40 mg oral tablet)?40 mg, Oral, BID  metoprolol (metoprolol tartrate 25 mg oral tab)?25 mg, Oral, Daily  mirtazapine (mirtazapine 30 mg oral tablet)?30 mg, Oral, qPM  montelukast (Singulair 10 mg oral TABLET)?10 mg, Oral, qAM  multivitamin (Multiple Vitamins oral tablet)?1 tab(s), Oral, qAM  senna (senna 8.6 mg oral tablet)?17.2 mg, Oral, BID  zinc sulfate (zinc sulfate 220 mg oral tablet)?220 mg, Oral, Daily  Discontinue  amoxicillin-clavulanate (amoxicillin-clavulanate 500 mg-125 mg oral tablet)?1 tab(s), Oral, BID  Education and Orders Provided  Urinary Tract Infection, Adult, Easy-to-Read  Heart Failure, Easy-to-Read  Low-Sodium Eating Plan  Discharge Activity - Activity as  Tolerated?  Discharge Diet - Low Sodium?  Discharge - 05/28/21 10:00:00 CDT, Post-Acute Services/Facilities?  Follow up  Maynor Hampton, within 7 to 10 days  ????Schedule a follow-up with Dr. Hampton in seven to ten days.